# Patient Record
Sex: MALE | Race: WHITE | NOT HISPANIC OR LATINO | URBAN - METROPOLITAN AREA
[De-identification: names, ages, dates, MRNs, and addresses within clinical notes are randomized per-mention and may not be internally consistent; named-entity substitution may affect disease eponyms.]

---

## 2017-12-09 ENCOUNTER — INPATIENT (INPATIENT)
Facility: HOSPITAL | Age: 57
LOS: 0 days | Discharge: HOME | End: 2017-12-10
Attending: INTERNAL MEDICINE
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
## 2017-12-14 DIAGNOSIS — T18.128A FOOD IN ESOPHAGUS CAUSING OTHER INJURY, INITIAL ENCOUNTER: ICD-10-CM
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
## 2024-04-05 ENCOUNTER — EMERGENCY (EMERGENCY)
Facility: HOSPITAL | Age: 64
LOS: 0 days | Discharge: HOME | End: 2024-04-05
Attending: EMERGENCY MEDICINE
Payer: COMMERCIAL

## 2024-04-05 VITALS
HEART RATE: 67 BPM | DIASTOLIC BLOOD PRESSURE: 95 MMHG | SYSTOLIC BLOOD PRESSURE: 183 MMHG | OXYGEN SATURATION: 99 % | RESPIRATION RATE: 19 BRPM | TEMPERATURE: 98 F | WEIGHT: 158.07 LBS

## 2024-04-05 VITALS
TEMPERATURE: 99 F | DIASTOLIC BLOOD PRESSURE: 84 MMHG | RESPIRATION RATE: 16 BRPM | OXYGEN SATURATION: 99 % | SYSTOLIC BLOOD PRESSURE: 148 MMHG | HEART RATE: 72 BPM

## 2024-04-05 DIAGNOSIS — H04.129 DRY EYE SYNDROME OF UNSPECIFIED LACRIMAL GLAND: ICD-10-CM

## 2024-04-05 DIAGNOSIS — G51.0 BELL'S PALSY: ICD-10-CM

## 2024-04-05 DIAGNOSIS — R29.810 FACIAL WEAKNESS: ICD-10-CM

## 2024-04-05 DIAGNOSIS — I10 ESSENTIAL (PRIMARY) HYPERTENSION: ICD-10-CM

## 2024-04-05 LAB
ALBUMIN SERPL ELPH-MCNC: 4.5 G/DL — SIGNIFICANT CHANGE UP (ref 3.5–5.2)
ALP SERPL-CCNC: 77 U/L — SIGNIFICANT CHANGE UP (ref 30–115)
ALT FLD-CCNC: 13 U/L — SIGNIFICANT CHANGE UP (ref 0–41)
ANION GAP SERPL CALC-SCNC: 11 MMOL/L — SIGNIFICANT CHANGE UP (ref 7–14)
APTT BLD: 27.2 SEC — SIGNIFICANT CHANGE UP (ref 27–39.2)
AST SERPL-CCNC: 18 U/L — SIGNIFICANT CHANGE UP (ref 0–41)
BASOPHILS # BLD AUTO: 0.06 K/UL — SIGNIFICANT CHANGE UP (ref 0–0.2)
BASOPHILS NFR BLD AUTO: 1 % — SIGNIFICANT CHANGE UP (ref 0–1)
BILIRUB SERPL-MCNC: 0.8 MG/DL — SIGNIFICANT CHANGE UP (ref 0.2–1.2)
BUN SERPL-MCNC: 17 MG/DL — SIGNIFICANT CHANGE UP (ref 10–20)
CALCIUM SERPL-MCNC: 9.6 MG/DL — SIGNIFICANT CHANGE UP (ref 8.4–10.5)
CHLORIDE SERPL-SCNC: 105 MMOL/L — SIGNIFICANT CHANGE UP (ref 98–110)
CO2 SERPL-SCNC: 22 MMOL/L — SIGNIFICANT CHANGE UP (ref 17–32)
CREAT SERPL-MCNC: 1 MG/DL — SIGNIFICANT CHANGE UP (ref 0.7–1.5)
EGFR: 85 ML/MIN/1.73M2 — SIGNIFICANT CHANGE UP
EOSINOPHIL # BLD AUTO: 0.09 K/UL — SIGNIFICANT CHANGE UP (ref 0–0.7)
EOSINOPHIL NFR BLD AUTO: 1.5 % — SIGNIFICANT CHANGE UP (ref 0–8)
GLUCOSE SERPL-MCNC: 98 MG/DL — SIGNIFICANT CHANGE UP (ref 70–99)
HCT VFR BLD CALC: 41.7 % — LOW (ref 42–52)
HGB BLD-MCNC: 14.8 G/DL — SIGNIFICANT CHANGE UP (ref 14–18)
IMM GRANULOCYTES NFR BLD AUTO: 0.3 % — SIGNIFICANT CHANGE UP (ref 0.1–0.3)
INR BLD: 1.15 RATIO — SIGNIFICANT CHANGE UP (ref 0.65–1.3)
LYMPHOCYTES # BLD AUTO: 0.91 K/UL — LOW (ref 1.2–3.4)
LYMPHOCYTES # BLD AUTO: 15.1 % — LOW (ref 20.5–51.1)
MCHC RBC-ENTMCNC: 32.5 PG — HIGH (ref 27–31)
MCHC RBC-ENTMCNC: 35.5 G/DL — SIGNIFICANT CHANGE UP (ref 32–37)
MCV RBC AUTO: 91.4 FL — SIGNIFICANT CHANGE UP (ref 80–94)
MONOCYTES # BLD AUTO: 0.84 K/UL — HIGH (ref 0.1–0.6)
MONOCYTES NFR BLD AUTO: 13.9 % — HIGH (ref 1.7–9.3)
NEUTROPHILS # BLD AUTO: 4.12 K/UL — SIGNIFICANT CHANGE UP (ref 1.4–6.5)
NEUTROPHILS NFR BLD AUTO: 68.2 % — SIGNIFICANT CHANGE UP (ref 42.2–75.2)
NRBC # BLD: 0 /100 WBCS — SIGNIFICANT CHANGE UP (ref 0–0)
PLATELET # BLD AUTO: 143 K/UL — SIGNIFICANT CHANGE UP (ref 130–400)
PMV BLD: 12.5 FL — HIGH (ref 7.4–10.4)
POTASSIUM SERPL-MCNC: 4.2 MMOL/L — SIGNIFICANT CHANGE UP (ref 3.5–5)
POTASSIUM SERPL-SCNC: 4.2 MMOL/L — SIGNIFICANT CHANGE UP (ref 3.5–5)
PROT SERPL-MCNC: 6.6 G/DL — SIGNIFICANT CHANGE UP (ref 6–8)
PROTHROM AB SERPL-ACNC: 13.1 SEC — HIGH (ref 9.95–12.87)
RBC # BLD: 4.56 M/UL — LOW (ref 4.7–6.1)
RBC # FLD: 12.1 % — SIGNIFICANT CHANGE UP (ref 11.5–14.5)
SODIUM SERPL-SCNC: 138 MMOL/L — SIGNIFICANT CHANGE UP (ref 135–146)
TROPONIN T, HIGH SENSITIVITY RESULT: 14 NG/L — SIGNIFICANT CHANGE UP (ref 6–21)
WBC # BLD: 6.04 K/UL — SIGNIFICANT CHANGE UP (ref 4.8–10.8)
WBC # FLD AUTO: 6.04 K/UL — SIGNIFICANT CHANGE UP (ref 4.8–10.8)

## 2024-04-05 PROCEDURE — 85025 COMPLETE CBC W/AUTO DIFF WBC: CPT

## 2024-04-05 PROCEDURE — 70551 MRI BRAIN STEM W/O DYE: CPT | Mod: 26,MC

## 2024-04-05 PROCEDURE — 0042T: CPT | Mod: MC

## 2024-04-05 PROCEDURE — 80053 COMPREHEN METABOLIC PANEL: CPT

## 2024-04-05 PROCEDURE — 70450 CT HEAD/BRAIN W/O DYE: CPT | Mod: 26,59,MC

## 2024-04-05 PROCEDURE — 99285 EMERGENCY DEPT VISIT HI MDM: CPT

## 2024-04-05 PROCEDURE — 70496 CT ANGIOGRAPHY HEAD: CPT | Mod: 26,MC

## 2024-04-05 PROCEDURE — 85730 THROMBOPLASTIN TIME PARTIAL: CPT

## 2024-04-05 PROCEDURE — 70551 MRI BRAIN STEM W/O DYE: CPT | Mod: MC

## 2024-04-05 PROCEDURE — 84484 ASSAY OF TROPONIN QUANT: CPT

## 2024-04-05 PROCEDURE — 70450 CT HEAD/BRAIN W/O DYE: CPT | Mod: MC

## 2024-04-05 PROCEDURE — 99223 1ST HOSP IP/OBS HIGH 75: CPT

## 2024-04-05 PROCEDURE — 85610 PROTHROMBIN TIME: CPT

## 2024-04-05 PROCEDURE — G0425: CPT | Mod: 95

## 2024-04-05 PROCEDURE — 70498 CT ANGIOGRAPHY NECK: CPT | Mod: MC

## 2024-04-05 PROCEDURE — 36415 COLL VENOUS BLD VENIPUNCTURE: CPT

## 2024-04-05 PROCEDURE — G0378: CPT

## 2024-04-05 PROCEDURE — 70496 CT ANGIOGRAPHY HEAD: CPT | Mod: MC

## 2024-04-05 PROCEDURE — 70498 CT ANGIOGRAPHY NECK: CPT | Mod: 26,MC

## 2024-04-05 RX ADMIN — Medication 60 MILLIGRAM(S): at 10:04

## 2024-04-05 NOTE — ED ADULT NURSE REASSESSMENT NOTE - NS ED NURSE REASSESS COMMENT FT1
Pt assessed at the bedisde, a/o x 3, cardiac monitoring continued, bed alarm in place. All alarms audible

## 2024-04-05 NOTE — ED PROVIDER NOTE - PROGRESS NOTE DETAILS
AE: Code stroke called in triage.  Neurology assessing patient while at CT scan. AE: Per neurology, recommending observation for MRI and to every 8 neurochecks.  If MRI is negative, symptoms are to be treated as Bell's palsy with a course of steroids and antivirals.

## 2024-04-05 NOTE — ED PROVIDER NOTE - ATTENDING CONTRIBUTION TO CARE
63-year-old male with PMH HTN presents for evaluation of left-sided facial droop.  Patient's wife noticed that she woke up yesterday around 6 AM which then improved and recurred again at 6 PM.  Patient did not want to come in for evaluation at that time but awoke this morning with persistent symptoms and wife brought to ED.  Patient denies any headache, dizziness, visual changes, ataxia.  No difficulty swallowing, shortness of breath, chest pain, N/V, trauma.  No focal weakness or paresthesias.    VITAL SIGNS: noted  CONSTITUTIONAL: Well-developed; well-nourished; in no acute distress  HEAD: Normocephalic; atraumatic  EYES: PERRL, EOM intact; conjunctiva and sclera clear  ENT: No nasal discharge; airway clear. MMM  NECK: Supple; non tender.    CARD: S1, S2 normal; no murmurs, gallops, or rubs. Regular rate and rhythm  RESP: CTAB/L, no wheezes, rales or rhonchi  ABD: Normal bowel sounds; soft; non-distended; non-tender; no CVA tenderness  EXT: Normal ROM. No calf tenderness or edema. Distal pulses intact  NEURO: NEURO: AAO x 3, normal speech, + left facial droop,  negative pronator drift, no ataxia, negative Romberg, no dysdiadokinesia, no nystagmus, sensory equal and intact.   SKIN: Skin exam is warm and dry, no acute rash  MS: No midline spinal tenderness

## 2024-04-05 NOTE — ED CDU PROVIDER DISPOSITION NOTE - CLINICAL COURSE
63-year-old male with PMH of HTN on no medications, presents to the ED complaining of left-sided facial droop.  Wife first noticed the facial droop yesterday at 6 AM that resolved within moments; symptoms recurred at 6 PM and have not resolved since. Pt had no other acute complaints. Was Stroke code upon arrival. Neuro evaluated. CT wnls. Placed in obs. MR for chronic changes.   Likely bell's palsy. Will txt with steroids and pt/wife at bedside advised to fu with Primary care doctor.

## 2024-04-05 NOTE — ED PROVIDER NOTE - PHYSICAL EXAMINATION
PHYSICAL EXAM: I have reviewed current vital signs.  GENERAL: NAD, well-nourished; well-developed.  HEAD:  Normocephalic, atraumatic.  EYES: EOMI, PERRL, conjunctiva and sclera clear.  ENT: MMM, no erythema/exudates.  NECK: Supple, no JVD.  CHEST/LUNG: Clear to auscultation bilaterally; no wheezes, rales, or rhonchi.  HEART: Regular rate and rhythm, normal S1 and S2; no murmurs, rubs, or gallops.  ABDOMEN: Soft, nontender, nondistended.  EXTREMITIES:  2+ peripheral pulses; no clubbing, cyanosis, or edema.  PSYCH: Cooperative, appropriate, normal mood and affect.  NEUROLOGY: A&O x 3. Left-sided facial droop with forehead spared. Extremity strength and sensation intact.  SKIN: Warm and dry.

## 2024-04-05 NOTE — ED CDU PROVIDER INITIAL DAY NOTE - CLINICAL SUMMARY MEDICAL DECISION MAKING FREE TEXT BOX
63-year-old man, history of hypertension not on any medications, was placed in CDU for evaluation of left-sided facial droop first noticed yesterday at 6.  Stroke code was called on arrival, initial workup was unremarkable.  On my eval patient has apparent left-sided droop consistent with Bell's palsy as the forehead is definitely involved and patient is unable to blink.  Patient was seen by neurology, still requesting MRI, the patient has no other focal complaints.  Gave initial dose of steroids in consideration of Bell's, MRI was negative for additional findings.  Spoke extensively with patient and his wife regarding eye protection, eyewash, patching the eye closed at night, and wearing protective glasses.  They understand and will follow-up with neurology.  Pulse dose prednisone was prescribed, patient to return to the ED for persistent or worsening symptoms or any other concerns.

## 2024-04-05 NOTE — ED CDU PROVIDER DISPOSITION NOTE - PATIENT PORTAL LINK FT
You can access the FollowMyHealth Patient Portal offered by [facility] by registering at the following website: [email or web address]. By joining [facility]’s FollowMyHealth portal, you will also be able to view your health information using other applications (apps) compatible with our system.

## 2024-04-05 NOTE — CONSULT NOTE ADULT - ASSESSMENT
This 63y old male with PMH of HTN, presented with Lt facial weakness of LMN type. Exam did not show any other findings; however, the fluctatuing symptoms is not consistent with simple [place], and a stroke should be r/o. CTH was not significant, CTP no mismatch, CTA no LVO or significant stenosis.     Recommendations:  - MR brain wo   - Neuro check Q8H  - Permissive HTN  - Eye protection   - If MR negative, to be treated as Bell's palsy (prednisolone 60 mg daily for five days followed by a five-day taper by 10 mg per day, and valacyclovir 1000 mg three times daily for one week)    Case discussed with Dr. Sánchez stroke attending      Thank you for sharing this patient with me; please do not hesitate to contact me in case of any question.

## 2024-04-05 NOTE — ED CDU PROVIDER DISPOSITION NOTE - NSFOLLOWUPCLINICS_GEN_ALL_ED_FT
[facility]  Medicine  [address], [place]   Phone: [phone]  Fax:   Follow Up Time: 1-3 Days

## 2024-04-05 NOTE — ED CDU PROVIDER INITIAL DAY NOTE - OBJECTIVE STATEMENT
63-year-old male with PMH of HTN on no medications, presents to the ED complaining of left-sided facial droop.  Wife first noticed the facial droop yesterday at 6 AM that resolved within moments; symptoms recurred at 6 PM and have not resolved since.  Patient has not had any recent headache, dizziness, vision changes, difficulty swallowing, difficulty breathing, chest pain, vomiting, diarrhea, or falls/trauma.

## 2024-04-05 NOTE — ED PROVIDER NOTE - CLINICAL SUMMARY MEDICAL DECISION MAKING FREE TEXT BOX
Patient presented for evaluation of left facial droop, activated as a stroke code with evaluation by neuro.  Imaging without acute pathology noted.  Given deficit neuro recommended further workup to rule out stroke versus Bell's palsy.  Patient placed in EDOU for further imaging and evaluation.

## 2024-04-05 NOTE — ED CDU PROVIDER DISPOSITION NOTE - TIME SPENT DISCHARGE SVCS
you have sexual or close contact that they will likely need treatment. When should you call for help? Call your doctor now or seek immediate medical care if:    · You have signs of infection, such as:  ? Increased pain, swelling, warmth, or redness. ? Red streaks leading from the mite bites. ? Pus draining from a bite area. ? A fever. Watch closely for changes in your health, and be sure to contact your doctor if:    · Anyone else in your family has itching.     · You do not get better within 2 weeks. Where can you learn more? Go to [email or web address] org and sign in to your [employer] account. Enter D142 in the [facility] box to learn more about \"Scabies: Care Instructions. \"     If you do not have an account, please click on the \"Sign Up Now\" link. Current as of: July 2, 2020               Content Version: 12.8  © 2496-5689 Healthwise, [facility]. Care instructions adapted under license by [facility] ([facility]). If you have questions about a medical condition or this instruction, always ask your healthcare professional. [place] [address] any warranty or liability for your use of this information.
40

## 2024-04-05 NOTE — CONSULT NOTE ADULT - SUBJECTIVE AND OBJECTIVE BOX
Neurology Consult    This 63y old male with PMH of HTN not on treatment, presented with Lt facial weakness. The patient states that his wife noticed that his face is different on the Lt side in the morning, but it improved rapidly. The symptoms was noticed by the wife in the evening when she saw him again.   The patient denied any change in hearing, taste, pain behind the ear, recent flu like symptoms, skin rash, headache, any previous similar complains.   He denied any vision, loss, diplopia, slurred speech, vertigo, headache, recent head or neck trauma.    HPI:      PAST MEDICAL & SURGICAL HISTORY:      FAMILY HISTORY:      Social History: (-) x 3    Allergies    No Known Allergies    Intolerances        MEDICATIONS  (STANDING):    MEDICATIONS  (PRN):      Review of systems:    Constitutional: as per HPI  Eyes: No eye pain or discharge  ENMT:  No difficulty hearing; No sinus or throat pain  Neck: No pain or stiffness  Respiratory: No cough, wheezing, chills or hemoptysis  Cardiovascular: No chest pain, palpitations, shortness of breath, dyspnea on exertion  Gastrointestinal: No abdominal pain, nausea, vomiting or hematemesis; No diarrhea or constipation.   Genitourinary: No dysuria, frequency, hematuria or incontinence  Neurological: As per HPI  Skin: No rashes or lesions   Endocrine: No heat or cold intolerance; No hair loss  Musculoskeletal: No joint pain or swelling  Psychiatric: No depression, anxiety, mood swings  Heme/Lymph: No easy bruising or bleeding gums    Vital Signs Last 24 Hrs  T(C): 36.7 (05 Apr 2024 05:45), Max: 36.7 (05 Apr 2024 05:45)  T(F): 98 (05 Apr 2024 05:45), Max: 98 (05 Apr 2024 05:45)  HR: 68 (05 Apr 2024 05:45) (67 - 68)  BP: 167/89 (05 Apr 2024 05:45) (167/89 - 183/95)  BP(mean): 120 (05 Apr 2024 05:45) (120 - 120)  RR: 19 (05 Apr 2024 05:45) (19 - 19)  SpO2: 98% (05 Apr 2024 05:45) (98% - 99%)    Parameters below as of 05 Apr 2024 05:45  Patient On (Oxygen Delivery Method): room air        Examination:  General:  Appearance is consistent with chronologic age.  No abnormal facies.  Gross skin survey within normal limits.    Cognitive/Language:  The patient is oriented to person, place, time and date. Language with normal repetition, comprehension and naming.  Nondysarthric.    Eyes: intact VFF.  EOMI w/o nystagmus, skew or reported double vision.  PERRL.  No ptosis.    Face:  Facial sensation normal V1 - 3, Lt facial weakness LMN type, with House-Brackmann classification grade IV.    Ears/Nose/Throat:  Hearing grossly intact b/l.  Palate elevates midline.  Tongue and uvula midline.   Motor examination:   Normal tone, bulk and range of motion.  No tenderness, twitching, tremors or involuntary movements.  Formal Muscle Strength Testing: (MRC grade R/L) 5/5 UE; 5/5 LE.  No observable drift.  Reflexes:   2+ b/l biceps, triceps, brachioradialis, patella and Achilles.  Plantar response downgoing b/l.   Sensory examination:   Intact to light touch and pinprick, pain, temperature and proprioception and vibration in all extremities.  Cerebellum:   FTN/HKS intact with normal GABRIELA in all limbs.  No dysmetria or dysdiadokinesia.    Gait narrow based and normal.        Labs:   CBC Full  -  ( 05 Apr 2024 05:40 )  WBC Count : 6.04 K/uL  RBC Count : 4.56 M/uL  Hemoglobin : 14.8 g/dL  Hematocrit : 41.7 %  Platelet Count - Automated : 143 K/uL  Mean Cell Volume : 91.4 fL  Mean Cell Hemoglobin : 32.5 pg  Mean Cell Hemoglobin Concentration : 35.5 g/dL  Auto Neutrophil # : 4.12 K/uL  Auto Lymphocyte # : 0.91 K/uL  Auto Monocyte # : 0.84 K/uL  Auto Eosinophil # : 0.09 K/uL  Auto Basophil # : 0.06 K/uL  Auto Neutrophil % : 68.2 %  Auto Lymphocyte % : 15.1 %  Auto Monocyte % : 13.9 %  Auto Eosinophil % : 1.5 %  Auto Basophil % : 1.0 %    04-05    138  |  105  |  17  ----------------------------<  98  4.2   |  22  |  1.0    Ca    9.6      05 Apr 2024 05:40    TPro  6.6  /  Alb  4.5  /  TBili  0.8  /  DBili  x   /  AST  18  /  ALT  13  /  AlkPhos  77  04-05    LIVER FUNCTIONS - ( 05 Apr 2024 05:40 )  Alb: 4.5 g/dL / Pro: 6.6 g/dL / ALK PHOS: 77 U/L / ALT: 13 U/L / AST: 18 U/L / GGT: x           PT/INR - ( 05 Apr 2024 05:40 )   PT: 13.10 sec;   INR: 1.15 ratio         PTT - ( 05 Apr 2024 05:40 )  PTT:27.2 sec  Urinalysis Basic - ( 05 Apr 2024 05:40 )    Color: x / Appearance: x / SG: x / pH: x  Gluc: 98 mg/dL / Ketone: x  / Bili: x / Urobili: x   Blood: x / Protein: x / Nitrite: x   Leuk Esterase: x / RBC: x / WBC x   Sq Epi: x / Non Sq Epi: x / Bacteria: x          Neuroimaging:  [facility]:     04-05-24 @ 06:34

## 2024-04-05 NOTE — STROKE CODE NOTE - ASSESSMENT/PLAN
04/05/2024 5:35 a.m. RAYRAY COLÓN (1960) 63M with Hypertension presented with left-sided face weakness yesterday evening.  Wok-up this morning with persistence of symptoms.  NIHSS 2.  SBP 153mm Hg glu 98.  CT NEG, CTA no LVO CTP no perfusion deficits.  No TNK - out of window; no indications for thrombectomy (no LVO).

## 2024-04-05 NOTE — ED PROVIDER NOTE - OBJECTIVE STATEMENT
63-year-old male who denies past medical history, no medications, presents to the ED complaining of left-sided facial droop.  Wife first noticed the facial droop yesterday at 6 AM that resolved within moments; symptoms recurred at 6 PM and have not resolved since.  Patient has not had any recent headache, dizziness, vision changes, difficulty swallowing, difficulty breathing, chest pain, vomiting, diarrhea, or falls/trauma. 63-year-old male with PMH of HTN on no medications, presents to the ED complaining of left-sided facial droop.  Wife first noticed the facial droop yesterday at 6 AM that resolved within moments; symptoms recurred at 6 PM and have not resolved since.  Patient has not had any recent headache, dizziness, vision changes, difficulty swallowing, difficulty breathing, chest pain, vomiting, diarrhea, or falls/trauma.
